# Patient Record
Sex: MALE | Race: ASIAN | NOT HISPANIC OR LATINO | Employment: UNEMPLOYED | URBAN - METROPOLITAN AREA
[De-identification: names, ages, dates, MRNs, and addresses within clinical notes are randomized per-mention and may not be internally consistent; named-entity substitution may affect disease eponyms.]

---

## 2017-11-01 ENCOUNTER — HOSPITAL ENCOUNTER (EMERGENCY)
Facility: MEDICAL CENTER | Age: 27
End: 2017-11-02
Attending: EMERGENCY MEDICINE
Payer: MEDICAID

## 2017-11-01 ENCOUNTER — APPOINTMENT (OUTPATIENT)
Dept: RADIOLOGY | Facility: MEDICAL CENTER | Age: 27
End: 2017-11-01
Attending: EMERGENCY MEDICINE
Payer: MEDICAID

## 2017-11-01 DIAGNOSIS — R41.82 ALTERED MENTAL STATUS, UNSPECIFIED ALTERED MENTAL STATUS TYPE: ICD-10-CM

## 2017-11-01 LAB
ALBUMIN SERPL BCP-MCNC: 4.1 G/DL (ref 3.2–4.9)
ALBUMIN/GLOB SERPL: 1.2 G/DL
ALP SERPL-CCNC: 83 U/L (ref 30–99)
ALT SERPL-CCNC: 34 U/L (ref 2–50)
AMPHET UR QL SCN: NEGATIVE
ANION GAP SERPL CALC-SCNC: 9 MMOL/L (ref 0–11.9)
APAP SERPL-MCNC: <10 UG/ML (ref 10–30)
AST SERPL-CCNC: 39 U/L (ref 12–45)
BARBITURATES UR QL SCN: NEGATIVE
BASOPHILS # BLD AUTO: 0.3 % (ref 0–1.8)
BASOPHILS # BLD: 0.01 K/UL (ref 0–0.12)
BENZODIAZ UR QL SCN: POSITIVE
BILIRUB SERPL-MCNC: 0.5 MG/DL (ref 0.1–1.5)
BUN SERPL-MCNC: 14 MG/DL (ref 8–22)
BZE UR QL SCN: NEGATIVE
CALCIUM SERPL-MCNC: 8.6 MG/DL (ref 8.5–10.5)
CANNABINOIDS UR QL SCN: POSITIVE
CHLORIDE SERPL-SCNC: 111 MMOL/L (ref 96–112)
CO2 SERPL-SCNC: 19 MMOL/L (ref 20–33)
CREAT SERPL-MCNC: 0.87 MG/DL (ref 0.5–1.4)
EKG IMPRESSION: NORMAL
EOSINOPHIL # BLD AUTO: 0.08 K/UL (ref 0–0.51)
EOSINOPHIL NFR BLD: 2 % (ref 0–6.9)
ERYTHROCYTE [DISTWIDTH] IN BLOOD BY AUTOMATED COUNT: 36.6 FL (ref 35.9–50)
ETHANOL BLD-MCNC: 0.01 G/DL
GFR SERPL CREATININE-BSD FRML MDRD: >60 ML/MIN/1.73 M 2
GLOBULIN SER CALC-MCNC: 3.5 G/DL (ref 1.9–3.5)
GLUCOSE SERPL-MCNC: 73 MG/DL (ref 65–99)
HCT VFR BLD AUTO: 44.8 % (ref 42–52)
HGB BLD-MCNC: 15 G/DL (ref 14–18)
IMM GRANULOCYTES # BLD AUTO: 0.02 K/UL (ref 0–0.11)
IMM GRANULOCYTES NFR BLD AUTO: 0.5 % (ref 0–0.9)
LACTATE BLD-SCNC: 4.4 MMOL/L (ref 0.5–2)
LYMPHOCYTES # BLD AUTO: 0.89 K/UL (ref 1–4.8)
LYMPHOCYTES NFR BLD: 22.6 % (ref 22–41)
MCH RBC QN AUTO: 29.8 PG (ref 27–33)
MCHC RBC AUTO-ENTMCNC: 33.5 G/DL (ref 33.7–35.3)
MCV RBC AUTO: 88.9 FL (ref 81.4–97.8)
METHADONE UR QL SCN: NEGATIVE
MONOCYTES # BLD AUTO: 0.27 K/UL (ref 0–0.85)
MONOCYTES NFR BLD AUTO: 6.9 % (ref 0–13.4)
NEUTROPHILS # BLD AUTO: 2.67 K/UL (ref 1.82–7.42)
NEUTROPHILS NFR BLD: 67.7 % (ref 44–72)
NRBC # BLD AUTO: 0 K/UL
NRBC BLD AUTO-RTO: 0 /100 WBC
OPIATES UR QL SCN: NEGATIVE
OXYCODONE UR QL SCN: NEGATIVE
PCP UR QL SCN: NEGATIVE
PLATELET # BLD AUTO: 127 K/UL (ref 164–446)
PMV BLD AUTO: 10.6 FL (ref 9–12.9)
POTASSIUM SERPL-SCNC: 3.4 MMOL/L (ref 3.6–5.5)
PROPOXYPH UR QL SCN: NEGATIVE
PROT SERPL-MCNC: 7.6 G/DL (ref 6–8.2)
RBC # BLD AUTO: 5.04 M/UL (ref 4.7–6.1)
SALICYLATES SERPL-MCNC: 0 MG/DL (ref 15–25)
SODIUM SERPL-SCNC: 139 MMOL/L (ref 135–145)
TROPONIN I SERPL-MCNC: <0.01 NG/ML (ref 0–0.04)
TSH SERPL DL<=0.005 MIU/L-ACNC: 1.95 UIU/ML (ref 0.3–3.7)
WBC # BLD AUTO: 3.9 K/UL (ref 4.8–10.8)

## 2017-11-01 PROCEDURE — 85025 COMPLETE CBC W/AUTO DIFF WBC: CPT

## 2017-11-01 PROCEDURE — 70450 CT HEAD/BRAIN W/O DYE: CPT

## 2017-11-01 PROCEDURE — 700105 HCHG RX REV CODE 258: Performed by: EMERGENCY MEDICINE

## 2017-11-01 PROCEDURE — 80053 COMPREHEN METABOLIC PANEL: CPT

## 2017-11-01 PROCEDURE — 96374 THER/PROPH/DIAG INJ IV PUSH: CPT

## 2017-11-01 PROCEDURE — 62270 DX LMBR SPI PNXR: CPT

## 2017-11-01 PROCEDURE — 700111 HCHG RX REV CODE 636 W/ 250 OVERRIDE (IP): Performed by: EMERGENCY MEDICINE

## 2017-11-01 PROCEDURE — 84443 ASSAY THYROID STIM HORMONE: CPT

## 2017-11-01 PROCEDURE — 80307 DRUG TEST PRSMV CHEM ANLYZR: CPT

## 2017-11-01 PROCEDURE — 93005 ELECTROCARDIOGRAM TRACING: CPT | Performed by: EMERGENCY MEDICINE

## 2017-11-01 PROCEDURE — 83605 ASSAY OF LACTIC ACID: CPT

## 2017-11-01 PROCEDURE — 99285 EMERGENCY DEPT VISIT HI MDM: CPT

## 2017-11-01 PROCEDURE — 84484 ASSAY OF TROPONIN QUANT: CPT

## 2017-11-01 RX ORDER — LORAZEPAM 2 MG/ML
2 INJECTION INTRAMUSCULAR ONCE
Status: COMPLETED | OUTPATIENT
Start: 2017-11-01 | End: 2017-11-01

## 2017-11-01 RX ORDER — SODIUM CHLORIDE 9 MG/ML
1000 INJECTION, SOLUTION INTRAVENOUS ONCE
Status: COMPLETED | OUTPATIENT
Start: 2017-11-01 | End: 2017-11-01

## 2017-11-01 RX ADMIN — LORAZEPAM 2 MG: 2 INJECTION INTRAMUSCULAR; INTRAVENOUS at 20:36

## 2017-11-01 RX ADMIN — SODIUM CHLORIDE 1000 ML: 9 INJECTION, SOLUTION INTRAVENOUS at 20:36

## 2017-11-01 ASSESSMENT — LIFESTYLE VARIABLES: DO YOU DRINK ALCOHOL: NO

## 2017-11-02 VITALS
SYSTOLIC BLOOD PRESSURE: 112 MMHG | TEMPERATURE: 97.5 F | OXYGEN SATURATION: 98 % | HEART RATE: 85 BPM | HEIGHT: 66 IN | BODY MASS INDEX: 17.68 KG/M2 | WEIGHT: 110 LBS | DIASTOLIC BLOOD PRESSURE: 78 MMHG | RESPIRATION RATE: 76 BRPM

## 2017-11-02 LAB
BURR CELLS/RBC NFR CSF MANUAL: 0 %
CLARITY CSF: CLEAR
COLOR CSF: COLORLESS
COLOR SPUN CSF: COLORLESS
CRYPTOC AG SPEC QL LA: NORMAL
GLUCOSE CSF-MCNC: 56 MG/DL (ref 40–80)
GRAM STN SPEC: NORMAL
LACTATE BLD-SCNC: 1.1 MMOL/L (ref 0.5–2)
LYMPHOCYTES NFR CSF: 85 %
MONONUC CELLS NFR CSF: 14 %
NEUTROPHILS NFR CSF: 1 %
PROT CSF-MCNC: 78 MG/DL (ref 15–45)
RBC # CSF: 141 CELLS/UL
SIGNIFICANT IND 70042: NORMAL
SIGNIFICANT IND 70042: NORMAL
SITE SITE: NORMAL
SITE SITE: NORMAL
SOURCE SOURCE: NORMAL
SOURCE SOURCE: NORMAL
SPECIMEN VOL CSF: 5.5 ML
TUBE # CSF: 4
TUBE # CSF: 4
WBC # CSF: 19 CELLS/UL (ref 0–10)

## 2017-11-02 PROCEDURE — 84157 ASSAY OF PROTEIN OTHER: CPT

## 2017-11-02 PROCEDURE — 86403 PARTICLE AGGLUT ANTBDY SCRN: CPT

## 2017-11-02 PROCEDURE — 89051 BODY FLUID CELL COUNT: CPT

## 2017-11-02 PROCEDURE — 87205 SMEAR GRAM STAIN: CPT

## 2017-11-02 PROCEDURE — 87070 CULTURE OTHR SPECIMN AEROBIC: CPT

## 2017-11-02 PROCEDURE — 82945 GLUCOSE OTHER FLUID: CPT

## 2017-11-02 PROCEDURE — 83605 ASSAY OF LACTIC ACID: CPT

## 2017-11-02 RX ORDER — SULFAMETHOXAZOLE AND TRIMETHOPRIM 200; 40 MG/5ML; MG/5ML
10 SUSPENSION ORAL 2 TIMES DAILY
Qty: 1 QUANTITY SUFFICIENT | Refills: 0 | Status: SHIPPED | OUTPATIENT
Start: 2017-11-02 | End: 2017-11-16

## 2017-11-02 NOTE — ED PROVIDER NOTES
ED Provider Note    Scribed for Eric Leon M.D. by Santhosh Barbosa. 11/1/2017, 8:32 PM.    Primary care provider: Petra Hooker M.D.  Means of arrival: [place]  History obtained from: EMS  History limited by: None    CHIEF COMPLAINT  Chief Complaint   Patient presents with   • ALOC     possible seizure     HPI  Loki Riggins is a 27 y.o. male who presents to the Emergency Department after a possible seizure and fall prior to arrival. 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 There was no know specific head trauma and there is no evidence of trauma. While en route, he was combative with EMS and was given 10 of Versed and has a spit mask currently in place. EMS said the patient's father denied any other falls or trauma. Patient has had one previous seizure when they were a child but not since then. He is HIV+ and father states a history a history of drug use. He believes his last viral load was around 30,000 and his CD4 was  around 3-4 months ago. Patient states he smoked cigarettes and some marijuana. He also endorses chronic depression. Denies current headache, nausea, vomiting, fever, HI. The patient said that he is taking his retroviral medication for his HIV.     REVIEW OF SYSTEMS  Pertinent positives include fall, seizure-like activity. Pertinent negatives include no head trauma, other trauma, fever, HI, nausea, vomiting, headache. As above, all other systems reviewed and are negative.   See HPI for further details.    C.    PAST MEDICAL HISTORY   has a past medical history of AIDS (CMS-HCC).    SURGICAL HISTORY  patient denies any surgical history    SOCIAL HISTORY  Social History   Substance Use Topics   • Smoking status: Former Smoker     Packs/day: 0.25     Years: 4.00     Types: Cigarettes     Quit date: 12/25/2012   • Smokeless tobacco: Never Used   • Alcohol use No      History   Drug Use No     FAMILY HISTORY  None  "noted    CURRENT MEDICATIONS  No current facility-administered medications on file prior to encounter.      Current Outpatient Prescriptions on File Prior to Encounter   Medication Sig Dispense Refill   • moxifloxacin (VIGAMOX) 0.5 % SOLN Place 1 Drop in right eye 3 times a day. 1 Bottle 0   • erythromycin 5 MG/GM OINT Place  in right eye 2 Times a Day. Apply to Right forehead lesions and place thin ribbon to eyelid on Right eye 1 Tube 0   • cephALEXin (KEFLEX) 500 MG CAPS Take 1 Cap by mouth 2 times a day. 40 Cap 0   • hydrocodone-acetaminophen (NORCO) 7.5-325 MG per tablet Take 1-2 Tabs by mouth every 6 hours as needed. Indications: Moderate to Moderately Severe Pain 20 Each 0   • estradiol (ESTRACE) 2 MG TABS Take 2 mg by mouth every day. For 7 days then 4mg qd      • spironolactone (ALDACTONE) 25 MG TABS Take 25 mg by mouth every day.       • predniSONE (DELTASONE) 20 MG TABS Take 60 mg by mouth every day. For 3 days, then 40 mg qd X3 days, then 20 mg qd X3 days, then 10mg qd X3 days      • abacavir-lamivudine (EPZICOM) 600-300 MG per tablet Take 1 Tab by mouth every day.       • fluoxetine (PROZAC) 20 MG CAPS Take 20 mg by mouth every day.       • betamethasone dipropionate (DIPROLENE) 0.05 % OINT Apply  to affected area(s) 2 times a day.       • Darunavir Ethanolate (PREZISTA) 400 MG TABS Take 2 Tabs by mouth every day.    Indications: **non-formulary**     • ritonavir (NORVIR) 100 MG CAPS Take 100 mg by mouth every day.       • Nevirapine (VIRAMUNE XR) 400 MG TB24 Take 1 Tab by mouth every day.         ALLERGIES  No Known Allergies    PHYSICAL EXAM  VITAL SIGNS: /78   Pulse (!) 122   Temp 36.4 °C (97.5 °F)   Resp (!) 22   Ht 1.676 m (5' 6\")   Wt 49.9 kg (110 lb)   SpO2 100%   BMI 17.75 kg/m²   Vitals reviewed.  Constitutional: Unresponsive. Agitated and actively fighting EMS and nursing staff. Spit mask in place.  HENT: No signs of trauma, Bilateral external ears normal, Nose normal. Spittle on " face.  Eyes: Pupils are equal and reactive, Conjunctiva normal, Non-icteric.   Neck: Normal range of motion, No tenderness, Supple, No stridor.   Lymphatic: No lymphadenopathy noted.   Cardiovascular: Regular rate and rhythm, no murmurs.   Thorax & Lungs: Normal breath sounds, No respiratory distress, No wheezing, No chest tenderness.   Abdomen: Bowel sounds normal, Soft, No tenderness, No peritoneal signs, No masses, No pulsatile masses.   Skin: Warm, Dry, No erythema, No rash.   Back: No bony tenderness, No CVA tenderness.   Extremities: Intact distal pulses, No edema, No tenderness, No cyanosis  Musculoskeletal: Good range of motion in all major joints. No tenderness to palpation or major deformities noted.   Neurologic: Normal motor function, Normal sensory function.  Psychiatric: Agitated and actively fighting EMS and nursing staff. In restraints.    DIAGNOSTIC STUDIES / PROCEDURES    LABS  Labs Reviewed   CBC WITH DIFFERENTIAL - Abnormal; Notable for the following:        Result Value    WBC 3.9 (*)     MCHC 33.5 (*)     Platelet Count 127 (*)     Lymphs (Absolute) 0.89 (*)     All other components within normal limits   COMP METABOLIC PANEL - Abnormal; Notable for the following:     Potassium 3.4 (*)     Co2 19 (*)     All other components within normal limits   LACTIC ACID - Abnormal; Notable for the following:     Lactic Acid 4.4 (*)     All other components within normal limits   SALICYLATE - Abnormal; Notable for the following:     Salicylates, Quant. 0 (*)     All other components within normal limits   URINE DRUG SCREEN - Abnormal; Notable for the following:     Benzodiazepines Positive (*)     Cannabinoid Metab Positive (*)     All other components within normal limits   DIAGNOSTIC ALCOHOL - Abnormal; Notable for the following:     Diagnostic Alcohol 0.01 (*)     All other components within normal limits   TROPONIN   TSH   ACETAMINOPHEN   ESTIMATED GFR   LACTIC ACID      All labs reviewed by me.    EKG  Interpretation:  Interpreted by me    12 Lead EKG interpreted by me to show:  Normal sinus rhythm  Rate 82  Intervals: Normal  Peaked T waves  No T-Wave inversions  No STEMI  My impression of this EKG: Does not indicate ischemia or arrhythmia at this time.    RADIOLOGY  CT-HEAD W/O   Final Result      Head CT without contrast within normal limits. No evidence of acute cerebral infarction, hemorrhage or mass lesion.        The radiologist's interpretation of all radiological studies have been reviewed by me.    PROCEDURES  Lumbar Puncture Procedure Note    Indication: To obtain spinal fluid for diagnostic testing    Consent: The patient was counseled regarding the procedure, it's indications, risks, potential complications and alternatives and any questions were answered. Consent was obtained.    Procedure: The patient was placed in the sitting, supported by bed side stand, position and the appropriate landmarks were identified. The area was prepped and draped in the usual sterile fashion. Anesthesia was obtained using 4 cc of 1% Lidocaine without epinephrine. A spinal needle was inserted at the L3- L4 level with the stylet in place but was unsuccessful. A spinal needle was then inserted at the L4-L5 level until spinal fluid was returned. Opening pressure was not measured. At this point 4.0 cc of spinal fluid that was initially bloody, but cleared was obtained and sent for appropriate testing. The stylet was then replaced and the needle was withdrawn. A sterile dressing was placed over the site and the patient was placed in the supine position.    The patient tolerated the procedure well.    Complications: None    COURSE & MEDICAL DECISION MAKING  Nursing notes, VS, PMSFHx reviewed in chart.    Differential diagnoses include but not limited to: Intracranial hemorrhage, CNS Infection, Seizure, Electrolyte Abnormality, Drug ingestion, Suicide attempt.    8:32 PM - Patient seen and examined at bedside. Patient is  "tachycardic but afebrile with mildly low blood pressure. He is significantly agitated and spitting at rescue personnel. His physical exam was deferred while patient was restrained. He was subsequently given IV fluids as well as 2 mg of Ativan and approximately 30 minutes to one hour the patient had calmed and was oriented.Ordered for Urine Drug Screen, Acetaminophen, Salicylate, Diagnostic Alcohol, Troponin, TSH, CMP, Lactic Acid, CBC to evaluate.     8:57 PM - Nurse informed me the patient is lucid, cooperative, and responsive. Patient seen at bedside and I dicussed his initial presentation. He said he smoked cigarettes earlier and a small amount of marijuana. Patient states that he has had chronic depression but does not currently want to hurt himself. When asked to expand, he says that he \"does not want to live but does not want to die either\".    9:25 PM - Nurse informed me the patient has a lactic acid of 4.4.     10:00 PM - Reviewed current lab results. Ordered CT-Head. The patient's repeat lactic acid is 1.1. The rapid improvement from the previous value of 4.4 also supports a diagnosis of seizure. His lab results are otherwise unremarkable. A urine drug screen is positive for benzodiazepines and cannabinoid metabolites.    11:40 PM - Patient seen at bedside. His father and uncle have presented to the bedside and reports that he had a seizure and lost control of his body. They describe the episode as the patient tensing up. The episode lasted approximately 5-6 minutes. He lost control of his bladder. The patient said that he was very confused and does not remember the event or talking to me earlier. He is significantly more sociable and cooperative than before. The patient and his family all state that he is never combative. He does report recent headaches but no neck stiffness. He reports feeling hot over the course of the past several days but has not taken his temperature. He denies chest pain or shortness " of breath, abdominal pain, nausea or vomiting, or dysuria. His most recent CD4 count was approximately 3 months ago and he believes it was around . This puts him at significant risk for opportunistic infections. He had a reportedly recent viral load that was 30,000. I discussed that a lumbar puncture is necessary for further evaluation of his symptoms. I explained the possible advantages and disadvantages of having the procedure performed. He understands and is agreeable. Ordered repeat Lactic Acid.    1:45 AM - Performed Lumbar Puncture as noted above.    The patient's CSF reveals mildly elevated total white blood cell count to 19 as well as elevated total protein. Given these results, I think it is appropriate to admit the patient to the hospital for IV antibiotics and to see infectious disease. When I discussed this with the patient, he adamantly declined. I discussed the risks of leaving the hospital against medical advice including, but not limited to, death and permanent disability. I also discussed the benefits of coming into the hospital including IV antibiotics and treatment for a potentially reversible disease process. The patient has capacity and understood the risks, he was able to repeat them back to me. He was subsequently discharged against medical advice with strict return precautions, a prescription for Bactrim, and strong encouragement to call his primary care physician today for recheck. The patient promises me that he will call his primary care physician today and will return to the ER if his symptoms change.    The patient remains alert and oriented. His vital signs remain normal and stable. He was discharged as above.     The patients. FINAL IMPRESSION  1. Altered mental status, unspecified altered mental status type       Lumbar Puncture Procedure    I, Santhosh Barbosa (Jayshree), am scribing for, and in the presence of, Eric Leon M.D..    Electronically signed by: Santhosh Barbosa  (Scribe), 11/1/2017    IEric M.D. personally performed the services described in this documentation, as scribed by Santhosh Barbosa in my presence, and it is both accurate and complete.    The note accurately reflects work and decisions made by me.  Eric Leon  11/2/2017  2:08 AM

## 2017-11-02 NOTE — ED NOTES
"Chief Complaint   Patient presents with   • ALOC     possible seizure     /78   Pulse (!) 122   Temp 36.4 °C (97.5 °F)   Resp (!) 22   Ht 1.676 m (5' 6\")   Wt 49.9 kg (110 lb)   SpO2 100%   BMI 17.75 kg/m²   Patient brought in by jade from Work. Patient was working when he suddenly collapse and either suffered a seizure or a syncopal episode. Upon ems arrival, patient was combative and threatening ems crew. Patient arrived into er with four point restraints and was transferred into violent restraints once roomed. ERP called to bedside upon arrival and verbal orders of 2mg ativan ordered. Patient currently calm and resting in bed. When asked if the patient ngested any drugs patient stated that he took, \"Jesenia speed\". Patients presents altered with no SI/HI  "

## 2017-11-02 NOTE — ED NOTES
All lines and monitors DC'd.  Discharge instructions given, questions answered.  Ambulatory out of ER, escorted by RN.  Pt reports all belongings in possession.  Instructed not to drive after taking pain meds and pt verbalizes understanding.  Rx x one given. Patient leaving AMA, ERP aware, form signed

## 2017-11-02 NOTE — ED NOTES
Aileen from Lab called with critical result of Lactic Acid of 4.4 at 2121. Critical lab result read back to Aileen.   Dr. Leon notified of critical lab result at 2121.  Critical lab result read back by Dr. Leon.

## 2017-11-02 NOTE — DISCHARGE INSTRUCTIONS
HIV Infection and AIDS  HIV (human immunodeficiency virus) infection is a permanent (chronic) viral infection. HIV kills white blood cells that are called CD4 cells. These cells help to control your body's defense system (immune system) and fight infection. If you do not have enough CD4 cells, you can develop infections, cancers, and other health problems.  If it is not treated, HIV infection advances through three phases:  · Asymptomatic phase.  · Early symptomatic phase.  · Symptomatic phase (also known as AIDS, or acquired immunodeficiency syndrome).  CAUSES  HIV infection is caused by the human immunodeficiency virus. This virus is passed from one person to another person through sex, through contact with infected blood, or during childbirth or breastfeeding.  RISK FACTORS  · Having unprotected sex.  · Sharing needles or other drug equipment.  SYMPTOMS  Asymptomatic Phase  You may not feel sick, or you may only feel sick some of the time. Many people do not know that they have HIV in this phase. Symptoms may include:  · Low-grade fever.  · Rash.  · Fatigue.  · Sore throat.  · Headaches.  · Nausea, vomiting, or diarrhea.  · Night sweats.  Early Symptomatic Phase  You may notice:  · Your early symptoms getting worse or happening more often.  · Oral, vaginal, or rectal sores that are caused by infections.  · Problems that are related to inflammation, such as joint pain.  Symptomatic Phase (AIDS)  Your immune system no longer protects you from infections and other health problems. You may get opportunistic diseases, which are infections or conditions that you would not normally get if your immune system was healthy and working properly. Problems that are caused by opportunistic diseases include:  · Coughing.  · Trouble breathing.  · Diarrhea.  · Skin sores.  · Trouble swallowing.  · High fevers.  · Blurred vision.  · Stiff neck.  · Mental confusion.  You may also begin to notice:  · Weight loss.  · Tingling or pain  in your hands and feet.  · Mouth sores or tooth pain.  · Severe fatigue.  DIAGNOSIS  Your health care provider will do a screening test that looks for a chemical in your body that is produced only when it is trying to fight HIV. HIV is confirmed with another blood test.  TREATMENT  There is no cure for HIV infection, but there are treatments that can keep HIV from getting worse. You will be given medicines called antiretroviral therapy (ART) based on your lab tests, your medical history, past treatments for HIV, and your other health problems. ART will:  · Keep your immune system as healthy as possible and help it work better.  · Decrease the amount of HIV in your body.  · Reduce the risk of problems caused by HIV.  · Prolong your life.  · Improve the quality of your life.  · Help prevent passing HIV to someone else.  You will need to take ART for the rest of your life. You will need to have routine lab tests performed to monitor your treatment and immune system.  HOME CARE INSTRUCTIONS  · See your health care provider and have your blood tested every 3-6 months to monitor your health and to make sure your treatment is working.  · Take your medicines every day as directed by your health care provider.  · Stop or decrease your use of alcohol, tobacco, and recreational drugs, which can cause further damage to your immune system. They can also cause problems with your liver, lungs, and heart.  · Protect yourself from other sexual infections by using condoms when you have sex.  · Protect yourself from other blood infections by using your own equipment if you inject, smoke, or snort drugs. Do not share equipment.  · Tell your sexual partner(s) that you have HIV. Encourage them to get tested.  · Keep your vaccinations up to date. Make sure that you get all recommended vaccines, including vaccines for hepatitis A, hepatitis B, measles, and influenza.  · Eat in a healthy way, exercise, and get enough sleep.  · See your  dentist regularly. Brush and floss you teeth every day.  · See a counselor or a  to help you solve problems and find any services that you need.  · Get support from your family and friends.  PREVENTION  To prevent the spread of HIV:  · Talk with your health care provider about protecting your sexual partner(s) from HIV. Your health care provider may encourage your partner(s) to take HIV medicines to decrease the risk of getting HIV. These medicines are called pre-exposure prophylaxis (PrEP).  · Use a condom every time you have sexual intercourse. This includes vaginal, oral, and anal sexual activity.  ¨ The condom should be in place from the beginning to the end of sexual activity.  ¨ Use only latex or polyurethane condoms and water-based lubricants.  ¨ Wearing a condom reduces, but does not completely eliminate, your risk of spreading HIV.  ¨ Condoms also protect you from other STDs (sexually transmitted diseases).  · Avoid alcohol and recreational drugs that affect your judgment. They may make you forget to use a condom or increase your chances of participating in high-risk sex.  · Do not share equipment that you use to take drugs, such as needles, syringes, cookers, tourniquets, pipes, or straws. If you share equipment, clean it before and after you use it.  SEEK MEDICAL CARE IF:  · You lose a lot of weight.  · You have extreme fatigue.  · You have trouble swallowing.  · You have vomiting or diarrhea that does not get better.  · You have muscle pain or joint pain.  · You have any problems that are related to your medicines.  SEEK IMMEDIATE MEDICAL CARE IF:  · You have a rash that causes your skin to peel.  · You develop blisters inside your mouth.  · You have pain in your abdomen.  · You have swelling around your eyes or you have eye redness.  · You have a high fever and chills.  · You have shortness of breath or a cough that is dry (nonproductive) or wet (productive).  · You have vision problems,  such as blind spots, flashing lights, or decreased or blurred vision.  · You have a persistent headache, confusion, or changes in the way that you think or see things (altered mental status).     This information is not intended to replace advice given to you by your health care provider. Make sure you discuss any questions you have with your health care provider.     Document Released: 10/02/2015 Document Revised: 05/03/2016 Document Reviewed: 10/02/2015  [employer] Patient Education ©2016 [employer]

## 2017-11-05 LAB
BACTERIA CSF CULT: NORMAL
GRAM STN SPEC: NORMAL
SIGNIFICANT IND 70042: NORMAL
SITE SITE: NORMAL
SOURCE SOURCE: NORMAL